# Patient Record
Sex: FEMALE | Race: WHITE | NOT HISPANIC OR LATINO | URBAN - METROPOLITAN AREA
[De-identification: names, ages, dates, MRNs, and addresses within clinical notes are randomized per-mention and may not be internally consistent; named-entity substitution may affect disease eponyms.]

---

## 2018-03-19 ENCOUNTER — APPOINTMENT (RX ONLY)
Dept: URBAN - METROPOLITAN AREA CLINIC 43 | Facility: CLINIC | Age: 28
Setting detail: DERMATOLOGY
End: 2018-03-19

## 2018-03-19 DIAGNOSIS — L30.8 OTHER SPECIFIED DERMATITIS: ICD-10-CM

## 2018-03-19 PROBLEM — L70.0 ACNE VULGARIS: Status: ACTIVE | Noted: 2018-03-19

## 2018-03-19 PROBLEM — L30.9 DERMATITIS, UNSPECIFIED: Status: ACTIVE | Noted: 2018-03-19

## 2018-03-19 PROCEDURE — ? TREATMENT REGIMEN

## 2018-03-19 PROCEDURE — 99201: CPT

## 2018-03-19 PROCEDURE — ? COUNSELING

## 2018-03-19 NOTE — HPI: RASH
How Severe Is Your Rash?: moderate
Is This A New Presentation, Or A Follow-Up?: Rash
Additional History: \\n* new patient here for red bumps on hands off and on x a few years \\n* no sx \\n* she was treated with a topical for scabies in the past, she saw here primary care physician and they did a ink test  for scabies and it was negative \\n* no tx\\n*LMP on ocps

## 2018-03-19 NOTE — PROCEDURE: TREATMENT REGIMEN
Detail Level: Zone
Otc Regimen: Vanicream products
Samples Given: HC ointment 2.5% thin layer once daily only as needed